# Patient Record
Sex: FEMALE | Race: WHITE
[De-identification: names, ages, dates, MRNs, and addresses within clinical notes are randomized per-mention and may not be internally consistent; named-entity substitution may affect disease eponyms.]

---

## 2019-01-16 ENCOUNTER — HOSPITAL ENCOUNTER (OUTPATIENT)
Dept: HOSPITAL 75 - RAD | Age: 29
End: 2019-01-16
Attending: ORTHOPAEDIC SURGERY
Payer: COMMERCIAL

## 2019-01-16 DIAGNOSIS — M25.572: Primary | ICD-10-CM

## 2019-01-16 PROCEDURE — 73721 MRI JNT OF LWR EXTRE W/O DYE: CPT

## 2019-01-16 NOTE — DIAGNOSTIC IMAGING REPORT
PROCEDURE: MRI left joint lower extremity without contrast.



TECHNIQUE: Multiplanar, multisequence non contrast-enhanced MRI

of the left lower extremity was accomplished.



INDICATION: Left ankle pain.



The marrow signal intensity of the ankle appears normal. No

marrow edema or evidence of fracture is identified. The Achilles

tendon demonstrates normal signal intensity and normal

morphology. Oil marker was placed at the area of pain in the

lateral ankle. This is posterior to the peroneus brevis and

longus tendons. The peroneus brevis and longus tendons appear to

be intact. No tear is seen. Posterior tibialis, flexor digitorum,

flexor hallucis longus tendons are intact. Anterior and posterior

syndesmotic ligaments as well as anterior and posterior

talofibular ligaments appear to be intact. Deep and superficial

bands of the deltoid appear to be intact. The articular cartilage

is unremarkable. No osteochondral defect is seen. Talar dome is

unremarkable.



IMPRESSION: Essentially unremarkable MRI of the left ankle. No

definite ligamentous or tendinous abnormality is identified.



Dictated by: 



  Dictated on workstation # [id number]

## 2021-01-04 ENCOUNTER — HOSPITAL ENCOUNTER (OUTPATIENT)
Dept: HOSPITAL 75 - RAD | Age: 31
End: 2021-01-04
Attending: ORTHOPAEDIC SURGERY
Payer: COMMERCIAL

## 2021-01-04 VITALS — BODY MASS INDEX: 33.39 KG/M2 | WEIGHT: 200.4 LBS | HEIGHT: 65 IN

## 2021-01-04 DIAGNOSIS — M75.112: Primary | ICD-10-CM

## 2021-01-04 PROCEDURE — 73222 MRI JOINT UPR EXTREM W/DYE: CPT

## 2021-01-04 PROCEDURE — 73040 CONTRAST X-RAY OF SHOULDER: CPT

## 2021-01-04 PROCEDURE — 23350 INJECTION FOR SHOULDER X-RAY: CPT

## 2021-01-04 NOTE — DIAGNOSTIC IMAGING REPORT
PROCEDURE: MRI left joint upper extremity with contrast.



TECHNIQUE: Multiplanar, multisequence contrast-enhanced MRI of

the left upper extremity was accomplished.



INDICATION: History of prior shoulder surgery in 2008 and 2010.

Pain.



EXAMINATION: MRI of the left shoulder with contrast from

01/04/2021



COMPARISONS: None



FINDINGS:



The supraspinatus and infraspinatus tendons intact. The

subscapularis tendon is intact. The long head of the biceps

tendon lies in the bicipital groove and is also intact.



There is a small focal irregularity along the posterior inferior

labrum with undermining of contrast in the region suggesting a

small labral tear. No displaced labral material appreciated. The

biceps tendon anchor is intact.



Muscle volume is preserved. Visualized axilla unremarkable.



IMPRESSION:

1. Suspected tear of the posterior inferior labrum. Rotator cuff

intact.



Dictated by: 



  Dictated on workstation # [id number]

## 2021-01-04 NOTE — DIAGNOSTIC IMAGING REPORT
INDICATION: 

Left shoulder pain.



DETAILS OF THE PROCEDURE:

The patient was brought to the procedure room and placed on the

table in the supine position. The skin of the left shoulder was

prepped and draped in the usual sterile fashion. A small amount

of 1% lidocaine was utilized for local anesthesia. A 22-gauge

needle was advanced into the left shoulder at the rotator

interval. A 15 mL solution of iodinated contrast, normal saline,

and gadolinium was injected under fluoroscopic observation. The

needle was withdrawn and hemostasis was obtained. A total of 19

seconds of fluoroscopic time was utilized. The patient tolerated

the procedure well and was sent to MRI in satisfactory condition.



IMPRESSION: 

Successful left shoulder injection of a gadolinium contrast

solution using fluoroscopy.



Dictated by: 



  Dictated on workstation # [id number]